# Patient Record
Sex: FEMALE | URBAN - NONMETROPOLITAN AREA
[De-identification: names, ages, dates, MRNs, and addresses within clinical notes are randomized per-mention and may not be internally consistent; named-entity substitution may affect disease eponyms.]

---

## 2017-08-31 ENCOUNTER — OFFICE VISIT (OUTPATIENT)
Dept: FAMILY MEDICINE | Facility: CLINIC | Age: 62
End: 2017-08-31

## 2017-08-31 VITALS
RESPIRATION RATE: 16 BRPM | SYSTOLIC BLOOD PRESSURE: 134 MMHG | HEART RATE: 91 BPM | WEIGHT: 126.2 LBS | OXYGEN SATURATION: 97 % | DIASTOLIC BLOOD PRESSURE: 72 MMHG | BODY MASS INDEX: 26.49 KG/M2 | HEIGHT: 58 IN

## 2017-08-31 DIAGNOSIS — I10 BENIGN ESSENTIAL HYPERTENSION: Primary | ICD-10-CM

## 2017-08-31 PROCEDURE — 99203 OFFICE O/P NEW LOW 30 MIN: CPT | Performed by: FAMILY MEDICINE

## 2017-08-31 RX ORDER — TELMISARTAN AND AMLODIPINE 10; 80 MG/1; MG/1
1 TABLET ORAL DAILY
Qty: 30 TABLET | Refills: 1 | Status: SHIPPED | OUTPATIENT
Start: 2017-08-31

## 2017-08-31 NOTE — PROGRESS NOTES
"  SUBJECTIVE:   Estela Little is a 62 year old female who presents to clinic today for the following health issues:      Hypertension Follow-up      Outpatient blood pressures are not being checked.    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 She was started on blood pressure medication about 6 months ago, denies any other medical problem.    Problem list and histories reviewed & adjusted, as indicated.  Additional history: as documented    There is no problem list on file for this patient.    History reviewed. No pertinent surgical history.    Social History   Substance Use Topics     Smoking status: Never Smoker     Smokeless tobacco: Never Used     Alcohol use Yes      Comment: Occasional     Family History   Problem Relation Age of Onset     Hypertension Father          No current outpatient prescriptions on file.     No Known Allergies  No lab results found.   BP Readings from Last 3 Encounters:   08/31/17 134/72    Wt Readings from Last 3 Encounters:   08/31/17 126 lb 3.2 oz (57.2 kg)                  Labs reviewed in EPIC          Reviewed and updated as needed this visit by clinical staffTobacco  Med Hx  Surg Hx  Fam Hx  Soc Hx      Reviewed and updated as needed this visit by Provider         ROS:  Constitutional, HEENT, cardiovascular, pulmonary, gi and gu systems are negative, except as otherwise noted.      OBJECTIVE:   /72 (Cuff Size: Adult Regular)  Pulse 91  Resp 16  Ht 4' 10\" (1.473 m)  Wt 126 lb 3.2 oz (57.2 kg)  SpO2 97%  Breastfeeding? No  BMI 26.38 kg/m2  Body mass index is 26.38 kg/(m^2).  GENERAL: healthy, alert and no distress  EYES: Eyes grossly normal to inspection, PERRL and conjunctivae and sclerae normal  NECK: " no adenopathy, no asymmetry, masses, or scars and thyroid normal to palpation  RESP: lungs clear to auscultation - no rales, rhonchi or wheezes  CV: regular rate and rhythm, normal S1 S2, no S3 or S4, no murmur, click or rub, no peripheral edema and peripheral pulses strong  ABDOMEN: soft, nontender, no hepatosplenomegaly, no masses and bowel sounds normal  MS: no gross musculoskeletal defects noted, no edema  NEURO: Normal strength and tone, mentation intact and speech normal  PSYCH: mentation appears normal, affect normal/bright      ASSESSMENT/PLAN:         ICD-10-CM    1. Benign essential hypertension I10 Telmisartan-Amlodipine 80-10 MG TABS       62 -year-old female presents for medication refill. She has been taking telmisartan-amlodipine 80-5 twice daily, which is above the recommended dose of telmisartan. Telmisartan-amlodipine 80-10 once daily prescribed instead, common side effects discussed. Patient will be leaving [place] in 2 weeks. Suggested to follow with PCP back in [place] for routine screening and the workup. Written information provided as below. Patient understood and in agreement with the above plan. All questions answered.      MEDICATIONS:   Orders Placed This Encounter   Medications     Telmisartan-Amlodipine 80-10 MG TABS     Sig: Take 1 tablet by mouth daily     Dispense:  30 tablet     Refill:  1     Patient Instructions       Mauricio ejercicio para tener un corazón más erica  Usted podría estar preguntándose qué debe hacer para mejorar la yee de bermeo corazón. Si piensa en hacer ejercicio va por buen yojana. No necesita volverse un atleta, jian sí hacer nila cierta cantidad de ejercicio rápido y con energía para ayudar a fortalecer el corazón. Si le aguirre diagnosticado nila enfermedad del corazón, bermeo médico le puede recomendar ejercicios para ayudar a estabilizar bermeo enfermedad. Para que el ejercicio se convierta en un hábito, escoja actividades que torito seguras y que le diviertan.     Consulte con bermeo  proveedor de atención médica antes de comenzar un programa de ejercicios.    Por qué hacer ejercicio?     Mauricio ejercicio con nila persona amiga. Cuando la actividad es divertida, tendrá más probabilidades de no abandonar.   Hacer ejercicio en forma regular le ofrece muchos beneficios para la yee, tales gianna los siguientes:    Mejorar los niveles de colesterol en la loida, lo que ayuda a evitar aún más los problemas del corazón.    Bajar la presión arterial lo que ayuda a evitar los ataques al cerebro y al corazón.    Controlar la diabetes o disminuir el riesgo de desarrollar la enfermedad.    Mejorar el funcionamiento del corazón y los pulmones.    Alcanzar y mantener un peso saludable.    Formar músculos más jonathan y flexibles para mejorar bermeo actividad.    Prevenir caídas y fracturas al retardar la pérdida de masa de los huesos (osteoporosis).    Manejar mejor el estrés.  Sugerencias para hacer ejercicio  Acostúmbrese al ejercicio poco a poco: Al principio póngase metas fáciles, luego vaya aumentándolas.  Mauricio ejercicio la mayoría de los días de la semana: Trate de hacer actividad física de nivel moderado a intenso por un total de 150 minutos o más a la semana. Intente hacer 40 minutos, susan a cuatro veces a la semana. Para lograr los mejores resultados, la actividad debe durar un promedio de 40 minutos. Ejemplos de actividad física de intensidad moderada son caminar nila gissell en 15 minutos, o trabajar en el jardín de 30 a 45 minutos.  Aumente el nivel de actividad diaria: Junto con bermeo programa de ejercicios, trate de tener nila mayor actividad justine el día. Camine en vez de ir en automóvil. Mauricio más trabajos en la casa o en el jardín.  Escoja nila o más actividades que le gusten: Caminar es nila de las cosas más fáciles de hacer. También puede tratar de nadar, montar en bicicleta o pascale nila clase de gimnasia.  Deje de hacer el ejercicio y llame a bermeo médico si:    Tiene dolor en el pecho o siente  mareo.    Siente ardor, opresión, o pesadez en el pecho, el merlene, los hombros, la espalda o los brazos.    Siente nila gran falta de aire.    Le aumenta el dolor en los músculos o en las articulaciones.    Tiene palpitaciones o latidos cardíacos irregulares.   Date Last Reviewed: 3/7/2014    [id number] [employer]. [address] [place] [address]. Todos los derechos reservados. Esta información no pretende sustituir la atención médica profesional. Sólo bermeo médico puede diagnosticar y tratar un problema de yee.        Amlodipine; Telmisartan oral tablets  What is this medicine?  AMLODIPINE; TELMISARTAN (am NADIYA di peen; tel mi CHAY landers) is a combination of a calcium channel blocker and an angiotensin II antagonist. This medicine is used to treat high blood pressure.  How should I use this medicine?  Take this medicine by mouth with a glass of water. Follow the directions on the prescription label. Take with or without food. Take your medicine at regular intervals. Do not take it more often than directed. Do not stop taking except on your doctor's advice.  Talk to your pediatrician regarding the use of this medicine in children. Special care may be needed.  What side effects may I notice from receiving this medicine?  Side effects that you should report to your doctor or health care professional as soon as possible:    allergic reactions like skin rash, itching or hives, swelling of the face, lips, or tongue    breathing problems    chest pain    confusion    fast, irregular heartbeat    feeling faint or lightheaded, falls    low blood pressure    swelling of the hands, ankles, or feet    trouble passing urine or change in the amount of urine  Side effects that usually do not require medical attention (report to your prescriber or health care professional if they continue or are bothersome):    change in sex drive or performance    cough    diarrhea    flushing of face or  skin    headache    nausea, vomiting    stomach gas or pain    weak or tired  What may interact with this medicine?    eplerenone    medicines used for sleep during surgery    melatonin    potassium supplements    rifampin    salt substitutes    some diuretics    Luisa's Wort  What if I miss a dose?  If you miss a dose, take it as soon as you can. If it is almost time for your next dose, take only that dose. Do not take double or extra doses.  Where should I keep my medicine?  Keep out of the reach of children.  Store at room temperature between 15 and 30 degrees C (59 and 86 degrees F). Do not remove from blisters until immediately before use. Protect from moisture and light. Throw away any unused medicine after the expiration date.  What should I tell my health care provider before I take this medicine?  They need to know if you have any of these conditions:    heart failure, recent heart attack, or other heart problems    kidney disease    liver disease    an unusual or allergic reaction to amlodipine, telmisartan, other medicines, foods, dyes, or preservatives    pregnant or trying to get pregnant    breast-feeding  What should I watch for while using this medicine?  Visit your doctor or health care professional for regular checks on your progress. Check your blood pressure as directed. Ask your doctor or health care professional what your blood pressure should be and when you should contact him or her.  Women should inform their doctor if they wish to become pregnant or think they might be pregnant. There is a potential for serious side effects to an unborn child. Talk to your health care professional or pharmacist for more information.  You may get drowsy or dizzy. Do not drive, use machinery, or do anything that needs mental alertness until you know how this medicine affects you. Do not stand or sit up quickly, especially if you are an older patient. This reduces the risk of dizzy or fainting spells.  Alcohol may interfere with the effect of this medicine. Avoid alcoholic drinks.  If you are going to have surgery, tell your prescriber or health care professional that you are taking this medicine.  NOTE:This sheet is a summary. It may not cover all possible information. If you have questions about this medicine, talk to your doctor, pharmacist, or health care provider. Copyright  2017 [employer]            Mahad Griffin MD  [facility]

## 2017-08-31 NOTE — NURSING NOTE
"Chief Complaint   Patient presents with     Hypertension       Initial /72 (Cuff Size: Adult Regular)  Pulse 91  Resp 16  Ht 4' 10\" (1.473 m)  Wt 126 lb 3.2 oz (57.2 kg)  SpO2 97%  Breastfeeding? No  BMI 26.38 kg/m2 Estimated body mass index is 26.38 kg/(m^2) as calculated from the following:    Height as of this encounter: 4' 10\" (1.473 m).    Weight as of this encounter: 126 lb 3.2 oz (57.2 kg).  Medication Reconciliation: complete    "

## 2017-08-31 NOTE — MR AVS SNAPSHOT
After Visit Summary   8/31/2017    Estela Little    MRN: [id number]           Patient Information     Date Of Birth          1955        Visit Information        Provider Department      8/31/2017 9:40 AM Mahad Griffin MD [facility]        Today's Diagnoses     Benign essential hypertension    -  1      Care Instructions      Mauricio ejercicio para tener un corazón más erica  Usted podría estar preguntándose qué debe hacer para mejorar la yee de bermeo corazón. Si piensa en hacer ejercicio va por buen yojana. No necesita volverse un atleta, jian sí hacer nila cierta cantidad de ejercicio rápido y con energía para ayudar a fortalecer el corazón. Si le aguirre diagnosticado nila enfermedad del corazón, bermeo médico le puede recomendar ejercicios para ayudar a estabilizar bermeo enfermedad. Para que el ejercicio se convierta en un hábito, escoja actividades que torito seguras y que le diviertan.     Consulte con bermeo proveedor de atención médica antes de comenzar un programa de ejercicios.    Por qué hacer ejercicio?     Mauricio ejercicio con nila persona amiga. Cuando la actividad es divertida, tendrá más probabilidades de no abandonar.   Hacer ejercicio en forma regular le ofrece muchos beneficios para la yee, tales gianna los siguientes:    Mejorar los niveles de colesterol en la loida, lo que ayuda a evitar aún más los problemas del corazón.    Bajar la presión arterial lo que ayuda a evitar los ataques al cerebro y al corazón.    Controlar la diabetes o disminuir el riesgo de desarrollar la enfermedad.    Mejorar el funcionamiento del corazón y los pulmones.    Alcanzar y mantener un peso saludable.    Formar músculos más jonathan y flexibles para mejorar bermeo actividad.    Prevenir caídas y fracturas al retardar la pérdida de masa de los huesos (osteoporosis).    Manejar mejor el estrés.  Sugerencias para hacer ejercicio  Acostúmbrese al ejercicio poco a poco: Al principio póngase metas fáciles, luego  vaya aumentándolas.  Mauricio ejercicio la mayoría de los días de la semana: Trate de hacer actividad física de nivel moderado a intenso por un total de 150 minutos o más a la semana. Intente hacer 40 minutos, susan a cuatro veces a la semana. Para lograr los mejores resultados, la actividad debe durar un promedio de 40 minutos. Ejemplos de actividad física de intensidad moderada son caminar nila gissell en 15 minutos, o trabajar en el jardín de 30 a 45 minutos.  Aumente el nivel de actividad diaria: Junto con bermeo programa de ejercicios, trate de tener nila mayor actividad justine el día. Camine en vez de ir en automóvil. Mauricio más trabajos en la casa o en el jardín.  Escoja nila o más actividades que le gusten: Caminar es nila de las cosas más fáciles de hacer. También puede tratar de nadar, montar en bicicleta o pascale nila clase de gimnasia.  Deje de hacer el ejercicio y llame a bermeo médico si:    Tiene dolor en el pecho o siente mareo.    Siente ardor, opresión, o pesadez en el pecho, el merlene, los hombros, la espalda o los brazos.    Siente nila gran falta de aire.    Le aumenta el dolor en los músculos o en las articulaciones.    Tiene palpitaciones o latidos cardíacos irregulares.   Date Last Reviewed: 3/7/2014    [id number] [employer]. [address] [place] [address], [place] [address]. Todos los derechos reservados. Esta información no pretende sustituir la atención médica profesional. Sólo bermeo médico puede diagnosticar y tratar un problema de yee.        Amlodipine; Telmisartan oral tablets  What is this medicine?  AMLODIPINE; TELMISARTAN (am NADIYA pickering; tel mi CHAY landers) is a combination of a calcium channel blocker and an angiotensin II antagonist. This medicine is used to treat high blood pressure.  How should I use this medicine?  Take this medicine by mouth with a glass of water. Follow the directions on the prescription label. Take with or without food. Take your medicine at regular intervals. Do not take it  more often than directed. Do not stop taking except on your doctor's advice.  Talk to your pediatrician regarding the use of this medicine in children. Special care may be needed.  What side effects may I notice from receiving this medicine?  Side effects that you should report to your doctor or health care professional as soon as possible:    allergic reactions like skin rash, itching or hives, swelling of the face, lips, or tongue    breathing problems    chest pain    confusion    fast, irregular heartbeat    feeling faint or lightheaded, falls    low blood pressure    swelling of the hands, ankles, or feet    trouble passing urine or change in the amount of urine  Side effects that usually do not require medical attention (report to your prescriber or health care professional if they continue or are bothersome):    change in sex drive or performance    cough    diarrhea    flushing of face or skin    headache    nausea, vomiting    stomach gas or pain    weak or tired  What may interact with this medicine?    eplerenone    medicines used for sleep during surgery    melatonin    potassium supplements    rifampin    salt substitutes    some diuretics    Luisa's Wort  What if I miss a dose?  If you miss a dose, take it as soon as you can. If it is almost time for your next dose, take only that dose. Do not take double or extra doses.  Where should I keep my medicine?  Keep out of the reach of children.  Store at room temperature between 15 and 30 degrees C (59 and 86 degrees F). Do not remove from blisters until immediately before use. Protect from moisture and light. Throw away any unused medicine after the expiration date.  What should I tell my health care provider before I take this medicine?  They need to know if you have any of these conditions:    heart failure, recent heart attack, or other heart problems    kidney disease    liver disease    an unusual or allergic reaction to amlodipine, telmisartan,  other medicines, foods, dyes, or preservatives    pregnant or trying to get pregnant    breast-feeding  What should I watch for while using this medicine?  Visit your doctor or health care professional for regular checks on your progress. Check your blood pressure as directed. Ask your doctor or health care professional what your blood pressure should be and when you should contact him or her.  Women should inform their doctor if they wish to become pregnant or think they might be pregnant. There is a potential for serious side effects to an unborn child. Talk to your health care professional or pharmacist for more information.  You may get drowsy or dizzy. Do not drive, use machinery, or do anything that needs mental alertness until you know how this medicine affects you. Do not stand or sit up quickly, especially if you are an older patient. This reduces the risk of dizzy or fainting spells. Alcohol may interfere with the effect of this medicine. Avoid alcoholic drinks.  If you are going to have surgery, tell your prescriber or health care professional that you are taking this medicine.  NOTE:This sheet is a summary. It may not cover all possible information. If you have questions about this medicine, talk to your doctor, pharmacist, or health care provider. Copyright  2017 [employer]                Follow-ups after your visit        Who to contact     If you have questions or need follow up information about today's clinic visit or your schedule please contact [facility] directly at [phone].  Normal or non-critical lab and imaging results will be communicated to you by [employer], letter or phone within 4 business days after the clinic has received the results. If you do not hear from us within 7 days, please contact the clinic through [employer] or phone. If you have a critical or abnormal lab result, we will notify you by phone as soon as possible.  Submit refill requests through [employer] or  "call your pharmacy and they will forward the refill request to us. Please allow 3 business days for your refill to be completed.          Additional Information About Your Visit        MyChart Information     [employer] lets you send messages to your doctor, view your test results, renew your prescriptions, schedule appointments and more. To sign up, go to [email or web address] . Click on \"Log in\" on the left side of the screen, which will take you to the Welcome page. Then click on \"Sign up Now\" on the right side of the page.     You will be asked to enter the access code listed below, as well as some personal information. Please follow the directions to create your username and password.     Your access code is: [email or web address]  Expires: 2017 10:06 AM     Your access code will  in 90 days. If you need help or a new code, please call your [place] clinic or [phone].        Care EveryWhere ID     This is your Care EveryWhere ID. This could be used by other organizations to access your [place] medical records  [id number]        Your Vitals Were     Pulse Respirations Height Pulse Oximetry Breastfeeding? BMI (Body Mass Index)    91 16 4' 10\" (1.473 m) 97% No 26.38 kg/m2       Blood Pressure from Last 3 Encounters:   17 134/72    Weight from Last 3 Encounters:   17 126 lb 3.2 oz (57.2 kg)              Today, you had the following     No orders found for display         Today's Medication Changes          These changes are accurate as of: 17 10:06 AM.  If you have any questions, ask your nurse or doctor.               Start taking these medicines.        Dose/Directions    Telmisartan-Amlodipine 80-10 MG Tabs   Used for:  Benign essential hypertension   Started by:  Mahad Griffin MD        Dose:  1 tablet   Take 1 tablet by mouth daily   Quantity:  30 tablet   Refills:  1            Where to get your medicines      These medications were sent to [facility] [address] [place] [address] " - [address] [place] [address]  [address] [place], [facility] [address]     Phone:  [phone]     Telmisartan-Amlodipine 80-10 MG Tabs                Primary Care Provider    None Specified       No primary provider on file.        Equal Access to Services     GULSHAN TIRADO : Hadii keisha ayala hadmaryanno Soomaali, waaxda luqadaha, qaybta kaalmada adeegyada, javier olga lidiain hayaan nicholemagaly deshpande leatha ayala. So [facility] [phone].    ATENCIÓN: Si habla español, tiene a bermeo disposición servicios gratuitos de asistencia lingüística. Llame al [phone].    We comply with applicable federal civil rights laws and [place] laws. We do not discriminate on the basis of race, color, national origin, age, disability sex, sexual orientation or gender identity.            Thank you!     Thank you for choosing [facility]  for your care. Our goal is always to provide you with excellent care. Hearing back from our patients is one way we can continue to improve our services. Please take a few minutes to complete the written survey that you may receive in the mail after your visit with us. Thank you!             Your Updated Medication List - Protect others around you: Learn how to safely use, store and throw away your medicines at www.disposemymeds.org.          This list is accurate as of: 8/31/17 10:06 AM.  Always use your most recent med list.                   Brand Name Dispense Instructions for use Diagnosis    Telmisartan-Amlodipine 80-10 MG Tabs     30 tablet    Take 1 tablet by mouth daily    Benign essential hypertension

## 2017-08-31 NOTE — PATIENT INSTRUCTIONS
Mauricio ejercicio para tener un corazón más erica  Usted podría estar preguntándose qué debe hacer para mejorar la yee de bermeo corazón. Si piensa en hacer ejercicio va por buen yojana. No necesita volverse un atleta, jian sí hacer nila cierta cantidad de ejercicio rápido y con energía para ayudar a fortalecer el corazón. Si le aguirre diagnosticado nila enfermedad del corazón, bermeo médico le puede recomendar ejercicios para ayudar a estabilizar bermeo enfermedad. Para que el ejercicio se convierta en un hábito, escoja actividades que torito seguras y que le diviertan.     Consulte con bermeo proveedor de atención médica antes de comenzar un programa de ejercicios.    Por qué hacer ejercicio?     Mauricio ejercicio con nila persona amiga. Cuando la actividad es divertida, tendrá más probabilidades de no abandonar.   Hacer ejercicio en forma regular le ofrece muchos beneficios para la yee, tales gianna los siguientes:    Mejorar los niveles de colesterol en la loida, lo que ayuda a evitar aún más los problemas del corazón.    Bajar la presión arterial lo que ayuda a evitar los ataques al cerebro y al corazón.    Controlar la diabetes o disminuir el riesgo de desarrollar la enfermedad.    Mejorar el funcionamiento del corazón y los pulmones.    Alcanzar y mantener un peso saludable.    Formar músculos más ojnathan y flexibles para mejorar bermeo actividad.    Prevenir caídas y fracturas al retardar la pérdida de masa de los huesos (osteoporosis).    Manejar mejor el estrés.  Sugerencias para hacer ejercicio  Acostúmbrese al ejercicio poco a poco: Al principio póngase metas fáciles, luego vaya aumentándolas.  Mauricio ejercicio la mayoría de los días de la semana: Trate de hacer actividad física de nivel moderado a intenso por un total de 150 minutos o más a la semana. Intente hacer 40 minutos, susan a cuatro veces a la semana. Para lograr los mejores resultados, la actividad debe durar un promedio de 40 minutos. Ejemplos de actividad física de  intensidad moderada son caminar nila gissell en 15 minutos, o trabajar en el jardín de 30 a 45 minutos.  Aumente el nivel de actividad diaria: Junto con bermeo programa de ejercicios, trate de tener nila mayor actividad justine el día. Camine en vez de ir en automóvil. Mauricio más trabajos en la casa o en el jardín.  Escoja nila o más actividades que le gusten: Caminar es nila de las cosas más fáciles de hacer. También puede tratar de nadar, montar en bicicleta o pascale nila clase de gimnasia.  Deje de hacer el ejercicio y llame a bermeo médico si:    Tiene dolor en el pecho o siente mareo.    Siente ardor, opresión, o pesadez en el pecho, el merlene, los hombros, la espalda o los brazos.    Siente nila gran falta de aire.    Le aumenta el dolor en los músculos o en las articulaciones.    Tiene palpitaciones o latidos cardíacos irregulares.   Date Last Reviewed: 3/7/2014    [id number] [employer]. [address] [place] [id number]. Todos los derechos reservados. Esta información no pretende sustituir la atención médica profesional. Sólo bermeo médico puede diagnosticar y tratar un problema de yee.        Amlodipine; Telmisartan oral tablets  What is this medicine?  AMLODIPINE; TELMISARTAN (am NADIYA di peen; tel mi CHAY landers) is a combination of a calcium channel blocker and an angiotensin II antagonist. This medicine is used to treat high blood pressure.  How should I use this medicine?  Take this medicine by mouth with a glass of water. Follow the directions on the prescription label. Take with or without food. Take your medicine at regular intervals. Do not take it more often than directed. Do not stop taking except on your doctor's advice.  Talk to your pediatrician regarding the use of this medicine in children. Special care may be needed.  What side effects may I notice from receiving this medicine?  Side effects that you should report to your doctor or health care professional as soon as possible:    allergic  reactions like skin rash, itching or hives, swelling of the face, lips, or tongue    breathing problems    chest pain    confusion    fast, irregular heartbeat    feeling faint or lightheaded, falls    low blood pressure    swelling of the hands, ankles, or feet    trouble passing urine or change in the amount of urine  Side effects that usually do not require medical attention (report to your prescriber or health care professional if they continue or are bothersome):    change in sex drive or performance    cough    diarrhea    flushing of face or skin    headache    nausea, vomiting    stomach gas or pain    weak or tired  What may interact with this medicine?    eplerenone    medicines used for sleep during surgery    melatonin    potassium supplements    rifampin    salt substitutes    some diuretics    Luisa's Wort  What if I miss a dose?  If you miss a dose, take it as soon as you can. If it is almost time for your next dose, take only that dose. Do not take double or extra doses.  Where should I keep my medicine?  Keep out of the reach of children.  Store at room temperature between 15 and 30 degrees C (59 and 86 degrees F). Do not remove from blisters until immediately before use. Protect from moisture and light. Throw away any unused medicine after the expiration date.  What should I tell my health care provider before I take this medicine?  They need to know if you have any of these conditions:    heart failure, recent heart attack, or other heart problems    kidney disease    liver disease    an unusual or allergic reaction to amlodipine, telmisartan, other medicines, foods, dyes, or preservatives    pregnant or trying to get pregnant    breast-feeding  What should I watch for while using this medicine?  Visit your doctor or health care professional for regular checks on your progress. Check your blood pressure as directed. Ask your doctor or health care professional what your blood pressure should be  and when you should contact him or her.  Women should inform their doctor if they wish to become pregnant or think they might be pregnant. There is a potential for serious side effects to an unborn child. Talk to your health care professional or pharmacist for more information.  You may get drowsy or dizzy. Do not drive, use machinery, or do anything that needs mental alertness until you know how this medicine affects you. Do not stand or sit up quickly, especially if you are an older patient. This reduces the risk of dizzy or fainting spells. Alcohol may interfere with the effect of this medicine. Avoid alcoholic drinks.  If you are going to have surgery, tell your prescriber or health care professional that you are taking this medicine.  NOTE:This sheet is a summary. It may not cover all possible information. If you have questions about this medicine, talk to your doctor, pharmacist, or health care provider. Copyright  2017 [employer]